# Patient Record
Sex: MALE | Race: WHITE | NOT HISPANIC OR LATINO | Employment: UNEMPLOYED | URBAN - METROPOLITAN AREA
[De-identification: names, ages, dates, MRNs, and addresses within clinical notes are randomized per-mention and may not be internally consistent; named-entity substitution may affect disease eponyms.]

---

## 2018-12-02 ENCOUNTER — HOSPITAL ENCOUNTER (EMERGENCY)
Facility: HOSPITAL | Age: 68
Discharge: HOME | End: 2018-12-02
Attending: EMERGENCY MEDICINE | Admitting: EMERGENCY MEDICINE

## 2018-12-02 VITALS
SYSTOLIC BLOOD PRESSURE: 146 MMHG | TEMPERATURE: 96.7 F | HEART RATE: 96 BPM | OXYGEN SATURATION: 99 % | DIASTOLIC BLOOD PRESSURE: 88 MMHG | HEIGHT: 72 IN | RESPIRATION RATE: 16 BRPM

## 2018-12-02 DIAGNOSIS — R55 VASOVAGAL NEAR SYNCOPE: Primary | ICD-10-CM

## 2018-12-02 DIAGNOSIS — J32.9 SINUSITIS, BACTERIAL: ICD-10-CM

## 2018-12-02 DIAGNOSIS — B96.89 SINUSITIS, BACTERIAL: ICD-10-CM

## 2018-12-02 LAB
ANION GAP SERPL CALCULATED.3IONS-SCNC: 8.3 MMOL/L
BASOPHILS # BLD AUTO: 0.03 10*3/MM3 (ref 0–0.2)
BASOPHILS NFR BLD AUTO: 0.4 % (ref 0–1.5)
BUN BLD-MCNC: 16 MG/DL (ref 8–23)
BUN/CREAT SERPL: 14.4 (ref 7–25)
CALCIUM SPEC-SCNC: 10 MG/DL (ref 8.6–10.5)
CHLORIDE SERPL-SCNC: 102 MMOL/L (ref 98–107)
CO2 SERPL-SCNC: 26.7 MMOL/L (ref 22–29)
CREAT BLD-MCNC: 1.11 MG/DL (ref 0.76–1.27)
DEPRECATED RDW RBC AUTO: 42.9 FL (ref 37–54)
EOSINOPHIL # BLD AUTO: 0.28 10*3/MM3 (ref 0–0.7)
EOSINOPHIL NFR BLD AUTO: 3.3 % (ref 0.3–6.2)
ERYTHROCYTE [DISTWIDTH] IN BLOOD BY AUTOMATED COUNT: 13.5 % (ref 11.5–14.5)
GFR SERPL CREATININE-BSD FRML MDRD: 66 ML/MIN/1.73
GLUCOSE BLD-MCNC: 147 MG/DL (ref 65–99)
HCT VFR BLD AUTO: 42.3 % (ref 40.4–52.2)
HGB BLD-MCNC: 15.3 G/DL (ref 13.7–17.6)
IMM GRANULOCYTES # BLD: 0.02 10*3/MM3 (ref 0–0.03)
IMM GRANULOCYTES NFR BLD: 0.2 % (ref 0–0.5)
LYMPHOCYTES # BLD AUTO: 1.02 10*3/MM3 (ref 0.9–4.8)
LYMPHOCYTES NFR BLD AUTO: 12.1 % (ref 19.6–45.3)
MCH RBC QN AUTO: 31.7 PG (ref 27–32.7)
MCHC RBC AUTO-ENTMCNC: 36.2 G/DL (ref 32.6–36.4)
MCV RBC AUTO: 87.6 FL (ref 79.8–96.2)
MONOCYTES # BLD AUTO: 0.62 10*3/MM3 (ref 0.2–1.2)
MONOCYTES NFR BLD AUTO: 7.4 % (ref 5–12)
NEUTROPHILS # BLD AUTO: 6.45 10*3/MM3 (ref 1.9–8.1)
NEUTROPHILS NFR BLD AUTO: 76.8 % (ref 42.7–76)
PLATELET # BLD AUTO: 161 10*3/MM3 (ref 140–500)
PMV BLD AUTO: 11.1 FL (ref 6–12)
POTASSIUM BLD-SCNC: 4.1 MMOL/L (ref 3.5–5.2)
RBC # BLD AUTO: 4.83 10*6/MM3 (ref 4.6–6)
SODIUM BLD-SCNC: 137 MMOL/L (ref 136–145)
TROPONIN T SERPL-MCNC: <0.01 NG/ML (ref 0–0.03)
WBC NRBC COR # BLD: 8.4 10*3/MM3 (ref 4.5–10.7)

## 2018-12-02 PROCEDURE — 80048 BASIC METABOLIC PNL TOTAL CA: CPT | Performed by: EMERGENCY MEDICINE

## 2018-12-02 PROCEDURE — 93010 ELECTROCARDIOGRAM REPORT: CPT | Performed by: INTERNAL MEDICINE

## 2018-12-02 PROCEDURE — 84484 ASSAY OF TROPONIN QUANT: CPT | Performed by: EMERGENCY MEDICINE

## 2018-12-02 PROCEDURE — 96360 HYDRATION IV INFUSION INIT: CPT

## 2018-12-02 PROCEDURE — 99283 EMERGENCY DEPT VISIT LOW MDM: CPT

## 2018-12-02 PROCEDURE — 93005 ELECTROCARDIOGRAM TRACING: CPT | Performed by: EMERGENCY MEDICINE

## 2018-12-02 PROCEDURE — 85025 COMPLETE CBC W/AUTO DIFF WBC: CPT | Performed by: EMERGENCY MEDICINE

## 2018-12-02 RX ORDER — SODIUM CHLORIDE 0.9 % (FLUSH) 0.9 %
10 SYRINGE (ML) INJECTION AS NEEDED
Status: DISCONTINUED | OUTPATIENT
Start: 2018-12-02 | End: 2018-12-02 | Stop reason: HOSPADM

## 2018-12-02 RX ORDER — ASPIRIN 81 MG/1
81 TABLET ORAL DAILY
COMMUNITY

## 2018-12-02 RX ORDER — AMOXICILLIN AND CLAVULANATE POTASSIUM 875; 125 MG/1; MG/1
1 TABLET, FILM COATED ORAL 2 TIMES DAILY
Qty: 14 TABLET | Refills: 0 | Status: SHIPPED | OUTPATIENT
Start: 2018-12-02 | End: 2018-12-09

## 2018-12-02 RX ADMIN — SODIUM CHLORIDE, POTASSIUM CHLORIDE, SODIUM LACTATE AND CALCIUM CHLORIDE 1000 ML: 600; 310; 30; 20 INJECTION, SOLUTION INTRAVENOUS at 13:19

## 2018-12-02 NOTE — ED PROVIDER NOTES
" EMERGENCY DEPARTMENT ENCOUNTER    Room Number:  37/37  Date seen:  12/2/2018  Time seen: 12:24 PM  PCP: Tung Posadas MD  Historian: Pt, EMS      HPI:  Chief Complaint: Near Syncope  A complete HPI/ROS/PMH/PSH/SH/FH are unobtainable due to: none  Context: Ld Carmen is a 68 y.o. male who presents to the ED c/o near syncopal episode that occurred just PTA, currently resolved. 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 Pt reports prodrome of light-headedness and became diaphoretic, pale, and cool to touch. Pt also c/o sinus pain. Pt denies CP or SOA. Pt states he also had an episode of light-headedness and diaphoresis last night. Pt reports Hx of HTN. Pt was recently seen by Cardiologist which \"came back good\". Pt isn't a smoker. Per EMS, pt has no significant cardiac history. Per EMS, pt has no pain other than back pain from being on the stretcher. Per EMS, pt was given 250 cc of fluid and blood pressure was 158 systolic.     Location: n/a  Radiation: n/a  Quality: near syncope  Intensity/Severity: moderate  Duration: brief  Onset quality: gradual  Timing: episodic  Progression: resolved  Aggravating Factors: standing up  Alleviating Factors: rest  Previous Episodes: none  Treatment before arrival: Pt was given 250 cc of fluid by EMS.   Associated Symptoms: light-headedness, diaphoresis, pallor skin    PAST MEDICAL HISTORY  Active Ambulatory Problems     Diagnosis Date Noted   • No Active Ambulatory Problems     Resolved Ambulatory Problems     Diagnosis Date Noted   • No Resolved Ambulatory Problems     Past Medical History:   Diagnosis Date   • Arthritis    • Hypertension          PAST SURGICAL HISTORY  History reviewed. No pertinent surgical history.      FAMILY HISTORY  History reviewed. No pertinent family history.      SOCIAL HISTORY  Social History     Socioeconomic History   • Marital status:      Spouse name: Not on file   • Number of children: Not on " file   • Years of education: Not on file   • Highest education level: Not on file   Social Needs   • Financial resource strain: Not on file   • Food insecurity - worry: Not on file   • Food insecurity - inability: Not on file   • Transportation needs - medical: Not on file   • Transportation needs - non-medical: Not on file   Occupational History   • Not on file   Tobacco Use   • Smoking status: Never Smoker   Substance and Sexual Activity   • Alcohol use: Yes     Frequency: Never     Comment: occ   • Drug use: Not on file   • Sexual activity: Not on file   Other Topics Concern   • Not on file   Social History Narrative   • Not on file         ALLERGIES  Heparin and Nitrates, organic        REVIEW OF SYSTEMS  Review of Systems   Constitutional: Positive for diaphoresis. Negative for chills and fever.   HENT: Positive for sinus pain.    Eyes: Negative.    Respiratory: Negative for shortness of breath.    Cardiovascular: Negative for chest pain.   Gastrointestinal: Negative for abdominal pain.   Genitourinary: Negative.    Musculoskeletal: Negative.    Skin: Positive for pallor.   Neurological: Positive for dizziness and light-headedness.   Psychiatric/Behavioral: Negative.             PHYSICAL EXAM  ED Triage Vitals [12/02/18 1212]   Temp Heart Rate Resp BP SpO2   96.7 °F (35.9 °C) 58 14 158/72 97 %      Temp src Heart Rate Source Patient Position BP Location FiO2 (%)   Tympanic Monitor -- -- --         GENERAL: not distressed  HENT: nares patent  EYES: no scleral icterus  CV: regular rhythm, regular rate, no murmur  RESPIRATORY: normal effort, CTAB  ABDOMEN: soft, non-tender  MUSCULOSKELETAL: no deformity, No BLE edema or tenderness.  NEURO: alert, FRANKLIN, FC  SKIN: warm, dry    Vital signs and nursing notes reviewed.      LAB RESULTS  Recent Results (from the past 24 hour(s))   Basic Metabolic Panel    Collection Time: 12/02/18 12:35 PM   Result Value Ref Range    Glucose 147 (H) 65 - 99 mg/dL    BUN 16 8 - 23 mg/dL     Creatinine 1.11 0.76 - 1.27 mg/dL    Sodium 137 136 - 145 mmol/L    Potassium 4.1 3.5 - 5.2 mmol/L    Chloride 102 98 - 107 mmol/L    CO2 26.7 22.0 - 29.0 mmol/L    Calcium 10.0 8.6 - 10.5 mg/dL    eGFR Non African Amer 66 >60 mL/min/1.73    BUN/Creatinine Ratio 14.4 7.0 - 25.0    Anion Gap 8.3 mmol/L   Troponin    Collection Time: 12/02/18 12:35 PM   Result Value Ref Range    Troponin T <0.010 0.000 - 0.030 ng/mL   CBC Auto Differential    Collection Time: 12/02/18 12:35 PM   Result Value Ref Range    WBC 8.40 4.50 - 10.70 10*3/mm3    RBC 4.83 4.60 - 6.00 10*6/mm3    Hemoglobin 15.3 13.7 - 17.6 g/dL    Hematocrit 42.3 40.4 - 52.2 %    MCV 87.6 79.8 - 96.2 fL    MCH 31.7 27.0 - 32.7 pg    MCHC 36.2 32.6 - 36.4 g/dL    RDW 13.5 11.5 - 14.5 %    RDW-SD 42.9 37.0 - 54.0 fl    MPV 11.1 6.0 - 12.0 fL    Platelets 161 140 - 500 10*3/mm3    Neutrophil % 76.8 (H) 42.7 - 76.0 %    Lymphocyte % 12.1 (L) 19.6 - 45.3 %    Monocyte % 7.4 5.0 - 12.0 %    Eosinophil % 3.3 0.3 - 6.2 %    Basophil % 0.4 0.0 - 1.5 %    Immature Grans % 0.2 0.0 - 0.5 %    Neutrophils, Absolute 6.45 1.90 - 8.10 10*3/mm3    Lymphocytes, Absolute 1.02 0.90 - 4.80 10*3/mm3    Monocytes, Absolute 0.62 0.20 - 1.20 10*3/mm3    Eosinophils, Absolute 0.28 0.00 - 0.70 10*3/mm3    Basophils, Absolute 0.03 0.00 - 0.20 10*3/mm3    Immature Grans, Absolute 0.02 0.00 - 0.03 10*3/mm3       Ordered the above labs and reviewed the results.      PROCEDURES  Procedures        EKG:           EKG time: 1244  Rhythm/Rate: NSR, 53  P waves and DE: 1st degree AV block  QRS, axis: PRWP low voltage   ST and T waves: normal     Interpreted Contemporaneously by me, independently viewed  No prior              MEDICATIONS GIVEN IN ER  Medications   sodium chloride 0.9 % flush 10 mL (not administered)   lactated ringers bolus 1,000 mL (1,000 mL Intravenous New Bag 12/2/18 1319)           PROGRESS AND CONSULTS       1230  Ordered lab work, EKG, and IVF's.     1413  Rechecked pt.  Pt is resting comfortably. Notified pt of unremarkable lab work, including negative troponin, and EKG results. Discussed the plan to discharge the pt home with prescriptions for Augmentin. I instructed the pt to drink plenty of fluids and f/u with PCP. RTER instructions given. Pt understands and agrees with the plan, all questions answered.    MEDICAL DECISION MAKING      MDM  Number of Diagnoses or Management Options  Sinusitis, bacterial:   Vasovagal near syncope:   Diagnosis management comments: Pt with near syncope. Good story that makes this safe for d/c home. Was standing up for long period of time. Pain with sinus pain and drainage for 2 weeks. This meets criteria for appropriate abx. Therefore, I prescribed for him. Gave good RTER precautions.        Amount and/or Complexity of Data Reviewed  Clinical lab tests: ordered and reviewed (Lab work is unremarkable.)  Tests in the medicine section of CPT®: ordered and reviewed (See EKG results in procedure)  Independent visualization of images, tracings, or specimens: yes           DIAGNOSIS  Final diagnoses:   Vasovagal near syncope   Sinusitis, bacterial         DISPOSITION  DISCHARGE    Patient discharged in stable condition.    Reviewed implications of results, diagnosis, meds, responsibility to follow up, warning signs and symptoms of possible worsening, potential complications and reasons to return to ER.    Patient/Family voiced understanding of above instructions.    Discussed plan for discharge, as there is no emergent indication for admission. Patient referred to primary care provider for BP management due to today's BP. Pt/family is agreeable and understands need for follow up and repeat testing.  Pt is aware that discharge does not mean that nothing is wrong but it indicates no emergency is present that requires admission and they must continue care with follow-up as given below or physician of their choice.     FOLLOW-UP  Tung Posadas MD  [address] [place] [address]  [phone]    Schedule an appointment as soon as possible for a visit   As needed         Medication List      New Prescriptions    amoxicillin-clavulanate 875-125 MG per tablet  Commonly known as:  AUGMENTIN  Take 1 tablet by mouth 2 (Two) Times a Day for 7 days.                      Latest Documented Vital Signs:  As of 2:18 PM  BP- 146/88 HR- 100 Temp- 96.7 °F (35.9 °C) (Tympanic) O2 sat- 99%        --  Documentation assistance provided by rodney Contreras for Dr. Rolf MD.  Information recorded by the scribpattie was done at my direction and has been verified and validated by me.          Nicholas Contreras  12/02/18 8780       Jonathan Bansal II, MD  12/02/18 9051

## 2018-12-02 NOTE — ED NOTES
Pt reports near-syncope after standing at [employer] today. Pt also had a similar episode yesterday, with near-syncope. Pt reports diaphoresis with these episodes. Pt received 250 mL NS with EMS.      Marcie Vasquez RN  12/02/18 4224

## 2019-08-05 ENCOUNTER — OFFICE VISIT (OUTPATIENT)
Dept: CARDIOLOGY | Facility: CLINIC | Age: 69
End: 2019-08-05

## 2019-08-05 VITALS
DIASTOLIC BLOOD PRESSURE: 81 MMHG | HEART RATE: 70 BPM | HEIGHT: 72 IN | WEIGHT: 247 LBS | SYSTOLIC BLOOD PRESSURE: 153 MMHG | BODY MASS INDEX: 33.46 KG/M2

## 2019-08-05 DIAGNOSIS — R01.1 HEART MURMUR: Primary | ICD-10-CM

## 2019-08-05 DIAGNOSIS — R06.09 DOE (DYSPNEA ON EXERTION): ICD-10-CM

## 2019-08-05 PROCEDURE — 93000 ELECTROCARDIOGRAM COMPLETE: CPT | Performed by: INTERNAL MEDICINE

## 2019-08-05 PROCEDURE — 99203 OFFICE O/P NEW LOW 30 MIN: CPT | Performed by: INTERNAL MEDICINE

## 2019-08-05 RX ORDER — FOLIC ACID 1 MG/1
TABLET ORAL
COMMUNITY
Start: 2019-06-08

## 2019-08-05 RX ORDER — TRIAMTERENE AND HYDROCHLOROTHIAZIDE 37.5; 25 MG/1; MG/1
1 TABLET ORAL
COMMUNITY

## 2019-08-05 RX ORDER — HYDRALAZINE HYDROCHLORIDE 50 MG/1
TABLET, FILM COATED ORAL
COMMUNITY
Start: 2019-05-24 | End: 2022-06-30

## 2019-08-05 RX ORDER — ALFUZOSIN HYDROCHLORIDE 10 MG/1
TABLET, EXTENDED RELEASE ORAL
COMMUNITY
Start: 2019-07-15 | End: 2022-06-30

## 2019-08-05 RX ORDER — MULTIVITAMIN WITH IRON
TABLET ORAL
COMMUNITY

## 2019-08-05 NOTE — PROGRESS NOTES
Subjective:        [facility]  Cardiology Consult Note    Patient Identification:  Name: Ld Carmen  Age: 68 y.o.  Sex: male  :  1950  MRN: [id number]             CC  MURMUR DETECTED BY PCP    OCCASIONAL CHESST TENDERNESS    LIGHTHEADED LAST DECEMBER      History of Present Illness:   68-year-old male here for the cardiac evaluation as well as establishment of the care was found to have a murmur detected by the primary care physician recently    Patient also has been complaining of shortness of breath on moderate exertion    Ld Carmen has been complaining of the shortness of breath mild-to-moderate in intensity with mild-to-moderate usually relieved with rest associated with anxiety and fatigue      Comorbid cardiac risk factors:     Past Medical History:  Past Medical History:   Diagnosis Date   • Arthritis    • Hypertension      Past Surgical History:  History reviewed. No pertinent surgical history.   Allergies:  Allergies   Allergen Reactions   • Heparin Other (See Comments)     HIT   • Nitrates, Organic Other (See Comments)     Cardiac arrest     Home Meds:    (Not in a hospital admission)  Current Meds:   [unfilled]  Social History:   Social History     Tobacco Use   • Smoking status: Never Smoker   • Smokeless tobacco: Never Used   Substance Use Topics   • Alcohol use: Yes     Frequency: Never     Comment: occ      Family History:  Family History   Problem Relation Age of Onset   • Hypertension Mother    • Heart disease Mother    • Heart disease Father         Review of Systems    Constitutional: No weakness,fatigue, fever, rigors, chills   Eyes: No vision changes, eye pain   ENT/oropharynx: No difficulty swallowing, sore throat, epistaxis, changes in hearing   Cardiovascular: No chest pain, chest tightness, palpitations, paroxysmal nocturnal dyspnea, orthopnea, diaphoresis, dizziness / syncopal episode   Respiratory: No shortness of breath, dyspnea on exertion, cough, wheezing  hemoptysis   Gastrointestinal: No abdominal pain, nausea, vomiting, diarrhea, bloody stools   Genitourinary: No hematuria, dysuria   Neurological: No headache, tremors, numbness,  one-sided weakness    Musculoskeletal: No cramps, myalgias,  joint pain, joint swelling   Integument: No rash, edema           Constitutional:  Heart Rate:  [70] 70  BP: (153)/(81) 153/81    Physical Exam   General:  Appears in no acute distress  Eyes: PERTL,  HEENT:  No JVD. Thyroid not visibly enlarged. No mucosal pallor or cyanosis  Respiratory: Respirations regular and unlabored at rest. BBS with good air entry in all fields. No crackles, rubs or wheezes auscultated  Cardiovascular: S1S2 Regular rate and rhythm. SYS murmur, rub or gallop auscultated. No carotid bruits. DP/PT pulses    . No pretibial pitting edema  Gastrointestinal: Abdomen soft, flat, non tender. Bowel sounds present. No hepatosplenomegaly. No ascites  Musculoskeletal: FRANKLIN x4. No abnormal movements  Extremities: No digital clubbing or cyanosis  Skin: Color pink. Skin warm and dry to touch. No rashes  No xanthoma  Neuro: AAO x3 CN II-XII grossly intact            ECG 12 Lead  Date/Time: 8/5/2019 1:19 PM  Performed by: Hardeep Barger MD  Authorized by: Hardeep Barger MD   Comparison: not compared with previous ECG   Previous ECG: no previous ECG available  Rhythm: sinus rhythm  ST Flattening: all    Clinical impression: non-specific ECG                Cardiographics  ECG:     Telemetry:    Echocardiogram:     Imaging  Chest X-ray:     Lab Review               @[email or web address]@              Assessment:/ Recommendations / Plan:   Patient Active Problem List   Diagnosis   • Heart murmur   • RUIZ (dyspnea on exertion)                    ICD-10-CM ICD-9-CM   1. Heart murmur R01.1 785.2   2. RUIZ (dyspnea on exertion) R06.09 786.09     1. Heart murmur  Considering the patient's symptoms as well as clinical situation and  EKG findings, along with cardiac risk  factors, ischemic workup is necessary to rule out ischemic cardiomyopathy, stress induced arrhythmias, and functional capacity for diagnosis as well as prognostic consideration    - Treadmill Stress Test  - Adult Transthoracic Echo Complete W/ Cont if Necessary Per Protocol    2. RUIZ (dyspnea on exertion)  Considering patient's medical condition as well as the risk factors, patient will require echocardiogram for further evaluation for the LV function, four-chamber evaluation, including the pressures, valvular function and  pericardial disease and pericardial effusion    - Treadmill Stress Test  - Adult Transthoracic Echo Complete W/ Cont if Necessary Per Protocol       ETT, ECHO        Labs/tests ordered for am      Hardeep Barger MD  8/5/2019, 1:19 PM      EMR Dragon/Transcription:   Dictated utilizing Dragon dictation

## 2019-08-16 PROBLEM — R06.09 DOE (DYSPNEA ON EXERTION): Status: ACTIVE | Noted: 2019-08-16

## 2019-08-16 PROBLEM — R01.1 HEART MURMUR: Status: ACTIVE | Noted: 2019-08-16

## 2019-08-21 ENCOUNTER — HOSPITAL ENCOUNTER (OUTPATIENT)
Dept: CARDIOLOGY | Facility: HOSPITAL | Age: 69
Discharge: HOME | End: 2019-08-21
Admitting: INTERNAL MEDICINE

## 2019-08-21 VITALS
HEART RATE: 79 BPM | BODY MASS INDEX: 33.46 KG/M2 | WEIGHT: 247 LBS | HEIGHT: 72 IN | SYSTOLIC BLOOD PRESSURE: 149 MMHG | DIASTOLIC BLOOD PRESSURE: 89 MMHG

## 2019-08-21 PROCEDURE — 93306 TTE W/DOPPLER COMPLETE: CPT | Performed by: INTERNAL MEDICINE

## 2019-08-21 PROCEDURE — 93306 TTE W/DOPPLER COMPLETE: CPT

## 2019-08-23 LAB
BH CV ECHO MEAS - ACS: 2.1 CM
BH CV ECHO MEAS - AO MAX PG (FULL): 0.48 MMHG
BH CV ECHO MEAS - AO MAX PG: 6 MMHG
BH CV ECHO MEAS - AO MEAN PG (FULL): 0 MMHG
BH CV ECHO MEAS - AO MEAN PG: 3 MMHG
BH CV ECHO MEAS - AO ROOT AREA (BSA CORRECTED): 1.5
BH CV ECHO MEAS - AO ROOT AREA: 10.2 CM^2
BH CV ECHO MEAS - AO ROOT DIAM: 3.6 CM
BH CV ECHO MEAS - AO V2 MAX: 122 CM/SEC
BH CV ECHO MEAS - AO V2 MEAN: 77.6 CM/SEC
BH CV ECHO MEAS - AO V2 VTI: 22.3 CM
BH CV ECHO MEAS - AVA(I,A): 4.5 CM^2
BH CV ECHO MEAS - AVA(I,D): 4.5 CM^2
BH CV ECHO MEAS - AVA(V,A): 4 CM^2
BH CV ECHO MEAS - AVA(V,D): 4 CM^2
BH CV ECHO MEAS - BSA(HAYCOCK): 2.4 M^2
BH CV ECHO MEAS - BSA: 2.3 M^2
BH CV ECHO MEAS - BZI_BMI: 33.5 KILOGRAMS/M^2
BH CV ECHO MEAS - BZI_METRIC_HEIGHT: 182.9 CM
BH CV ECHO MEAS - BZI_METRIC_WEIGHT: 112 KG
BH CV ECHO MEAS - EDV(CUBED): 68.9 ML
BH CV ECHO MEAS - EDV(MOD-SP2): 100 ML
BH CV ECHO MEAS - EDV(MOD-SP4): 103 ML
BH CV ECHO MEAS - EDV(TEICH): 74.2 ML
BH CV ECHO MEAS - EF(CUBED): 74.5 %
BH CV ECHO MEAS - EF(MOD-BP): 65 %
BH CV ECHO MEAS - EF(MOD-SP2): 63 %
BH CV ECHO MEAS - EF(MOD-SP4): 68 %
BH CV ECHO MEAS - EF(TEICH): 66.8 %
BH CV ECHO MEAS - ESV(CUBED): 17.6 ML
BH CV ECHO MEAS - ESV(MOD-SP2): 37 ML
BH CV ECHO MEAS - ESV(MOD-SP4): 33 ML
BH CV ECHO MEAS - ESV(TEICH): 24.6 ML
BH CV ECHO MEAS - FS: 36.6 %
BH CV ECHO MEAS - IVS/LVPW: 1.1
BH CV ECHO MEAS - IVSD: 1.2 CM
BH CV ECHO MEAS - LA DIMENSION: 4.9 CM
BH CV ECHO MEAS - LA/AO: 1.4
BH CV ECHO MEAS - LAT PEAK E' VEL: 10.1 CM/SEC
BH CV ECHO MEAS - LV DIASTOLIC VOL/BSA (35-75): 44.2 ML/M^2
BH CV ECHO MEAS - LV MASS(C)D: 161.4 GRAMS
BH CV ECHO MEAS - LV MASS(C)DI: 69.2 GRAMS/M^2
BH CV ECHO MEAS - LV MAX PG: 5.5 MMHG
BH CV ECHO MEAS - LV MEAN PG: 3 MMHG
BH CV ECHO MEAS - LV SYSTOLIC VOL/BSA (12-30): 14.2 ML/M^2
BH CV ECHO MEAS - LV V1 MAX: 117 CM/SEC
BH CV ECHO MEAS - LV V1 MEAN: 75 CM/SEC
BH CV ECHO MEAS - LV V1 VTI: 24.1 CM
BH CV ECHO MEAS - LVIDD: 4.1 CM
BH CV ECHO MEAS - LVIDS: 2.6 CM
BH CV ECHO MEAS - LVLD AP2: 7.6 CM
BH CV ECHO MEAS - LVLD AP4: 7.7 CM
BH CV ECHO MEAS - LVLS AP2: 6.2 CM
BH CV ECHO MEAS - LVLS AP4: 5.8 CM
BH CV ECHO MEAS - LVOT AREA (M): 4.2 CM^2
BH CV ECHO MEAS - LVOT AREA: 4.2 CM^2
BH CV ECHO MEAS - LVOT DIAM: 2.3 CM
BH CV ECHO MEAS - LVPWD: 1.1 CM
BH CV ECHO MEAS - MED PEAK E' VEL: 8.5 CM/SEC
BH CV ECHO MEAS - MV A DUR: 0.2 SEC
BH CV ECHO MEAS - MV A MAX VEL: 90.2 CM/SEC
BH CV ECHO MEAS - MV DEC SLOPE: 207 CM/SEC^2
BH CV ECHO MEAS - MV DEC TIME: 0.21 SEC
BH CV ECHO MEAS - MV E MAX VEL: 81 CM/SEC
BH CV ECHO MEAS - MV E/A: 0.9
BH CV ECHO MEAS - MV MAX PG: 3.2 MMHG
BH CV ECHO MEAS - MV MEAN PG: 1 MMHG
BH CV ECHO MEAS - MV P1/2T MAX VEL: 64.3 CM/SEC
BH CV ECHO MEAS - MV P1/2T: 91 MSEC
BH CV ECHO MEAS - MV V2 MAX: 88.8 CM/SEC
BH CV ECHO MEAS - MV V2 MEAN: 50 CM/SEC
BH CV ECHO MEAS - MV V2 VTI: 23.2 CM
BH CV ECHO MEAS - MVA P1/2T LCG: 3.4 CM^2
BH CV ECHO MEAS - MVA(P1/2T): 2.4 CM^2
BH CV ECHO MEAS - MVA(VTI): 4.3 CM^2
BH CV ECHO MEAS - PA ACC TIME: 0.11 SEC
BH CV ECHO MEAS - PA MAX PG (FULL): 1.4 MMHG
BH CV ECHO MEAS - PA MAX PG: 4.4 MMHG
BH CV ECHO MEAS - PA PR(ACCEL): 29.1 MMHG
BH CV ECHO MEAS - PA V2 MAX: 105 CM/SEC
BH CV ECHO MEAS - PULM A REVS DUR: 0.15 SEC
BH CV ECHO MEAS - PULM A REVS VEL: 33.5 CM/SEC
BH CV ECHO MEAS - PULM DIAS VEL: 37.6 CM/SEC
BH CV ECHO MEAS - PULM S/D: 1.8
BH CV ECHO MEAS - PULM SYS VEL: 69.3 CM/SEC
BH CV ECHO MEAS - PVA(V,A): 3.5 CM^2
BH CV ECHO MEAS - PVA(V,D): 3.5 CM^2
BH CV ECHO MEAS - QP/QS: 0.75
BH CV ECHO MEAS - RAP SYSTOLE: 3 MMHG
BH CV ECHO MEAS - RV MAX PG: 3 MMHG
BH CV ECHO MEAS - RV MEAN PG: 2 MMHG
BH CV ECHO MEAS - RV V1 MAX: 87.2 CM/SEC
BH CV ECHO MEAS - RV V1 MEAN: 61.3 CM/SEC
BH CV ECHO MEAS - RV V1 VTI: 18 CM
BH CV ECHO MEAS - RVDD: 3.2 CM
BH CV ECHO MEAS - RVOT AREA: 4.2 CM^2
BH CV ECHO MEAS - RVOT DIAM: 2.3 CM
BH CV ECHO MEAS - RVSP: 23.8 MMHG
BH CV ECHO MEAS - SI(AO): 97.4 ML/M^2
BH CV ECHO MEAS - SI(CUBED): 22 ML/M^2
BH CV ECHO MEAS - SI(LVOT): 43 ML/M^2
BH CV ECHO MEAS - SI(MOD-SP2): 27 ML/M^2
BH CV ECHO MEAS - SI(MOD-SP4): 30 ML/M^2
BH CV ECHO MEAS - SI(TEICH): 21.3 ML/M^2
BH CV ECHO MEAS - SV(AO): 227 ML
BH CV ECHO MEAS - SV(CUBED): 51.3 ML
BH CV ECHO MEAS - SV(LVOT): 100.1 ML
BH CV ECHO MEAS - SV(MOD-SP2): 63 ML
BH CV ECHO MEAS - SV(MOD-SP4): 70 ML
BH CV ECHO MEAS - SV(RVOT): 74.8 ML
BH CV ECHO MEAS - SV(TEICH): 49.6 ML
BH CV ECHO MEAS - TAPSE (>1.6): 3.2 CM
BH CV ECHO MEAS - TR MAX VEL: 228 CM/SEC
BH CV ECHO MEASUREMENTS AVERAGE E/E' RATIO: 8.71
BH CV XLRA - RV BASE: 3.4 CM
BH CV XLRA - RV LENGTH: 6.9 CM
BH CV XLRA - RV MID: 2.8 CM
BH CV XLRA - TDI S': 23.4 CM/SEC
LEFT ATRIUM VOLUME INDEX: 29 ML/M2

## 2019-08-26 ENCOUNTER — HOSPITAL ENCOUNTER (OUTPATIENT)
Dept: CARDIOLOGY | Facility: HOSPITAL | Age: 69
Discharge: HOME | End: 2019-08-26
Admitting: INTERNAL MEDICINE

## 2019-08-26 VITALS — DIASTOLIC BLOOD PRESSURE: 84 MMHG | HEART RATE: 108 BPM | SYSTOLIC BLOOD PRESSURE: 158 MMHG

## 2019-08-26 PROCEDURE — 93016 CV STRESS TEST SUPVJ ONLY: CPT | Performed by: INTERNAL MEDICINE

## 2019-08-26 PROCEDURE — 93017 CV STRESS TEST TRACING ONLY: CPT

## 2019-08-26 PROCEDURE — 93018 CV STRESS TEST I&R ONLY: CPT | Performed by: INTERNAL MEDICINE

## 2019-08-27 LAB
BH CV STRESS BP STAGE 1: NORMAL
BH CV STRESS BP STAGE 2: NORMAL
BH CV STRESS DURATION MIN STAGE 1: 3
BH CV STRESS DURATION MIN STAGE 2: 3
BH CV STRESS DURATION SEC STAGE 1: 0
BH CV STRESS DURATION SEC STAGE 2: 0
BH CV STRESS GRADE STAGE 1: 10
BH CV STRESS GRADE STAGE 2: 12
BH CV STRESS HR STAGE 1: 137
BH CV STRESS HR STAGE 2: 146
BH CV STRESS METS STAGE 1: 4.6
BH CV STRESS METS STAGE 2: 7.1
BH CV STRESS PROTOCOL 1: NORMAL
BH CV STRESS RECOVERY BP: NORMAL MMHG
BH CV STRESS RECOVERY HR: 111 BPM
BH CV STRESS SPEED STAGE 1: 1.7
BH CV STRESS SPEED STAGE 2: 2.5
BH CV STRESS STAGE 1: 1
BH CV STRESS STAGE 2: 2
MAXIMAL PREDICTED HEART RATE: 153 BPM
PERCENT MAX PREDICTED HR: 95.42 %
STRESS BASELINE BP: NORMAL MMHG
STRESS BASELINE HR: 107 BPM
STRESS PERCENT HR: 112 %
STRESS POST ESTIMATED WORKLOAD: 7.2 METS
STRESS POST EXERCISE DUR MIN: 6 MIN
STRESS POST EXERCISE DUR SEC: 0 SEC
STRESS POST PEAK BP: NORMAL MMHG
STRESS POST PEAK HR: 146 BPM
STRESS TARGET HR: 130 BPM

## 2019-09-05 ENCOUNTER — OFFICE VISIT (OUTPATIENT)
Dept: CARDIOLOGY | Facility: CLINIC | Age: 69
End: 2019-09-05

## 2019-09-05 VITALS
HEART RATE: 80 BPM | DIASTOLIC BLOOD PRESSURE: 76 MMHG | HEIGHT: 72 IN | SYSTOLIC BLOOD PRESSURE: 145 MMHG | BODY MASS INDEX: 32.64 KG/M2 | WEIGHT: 241 LBS

## 2019-09-05 DIAGNOSIS — R01.1 HEART MURMUR: ICD-10-CM

## 2019-09-05 DIAGNOSIS — R06.09 DOE (DYSPNEA ON EXERTION): Primary | ICD-10-CM

## 2019-09-05 PROCEDURE — 99213 OFFICE O/P EST LOW 20 MIN: CPT | Performed by: INTERNAL MEDICINE

## 2019-09-05 NOTE — PROGRESS NOTES
" Subjective:        Ld Carmen is a 69 y.o. male who here for follow up    CC  Follow-up after the stress test and echocardiogram  HPI  69-year-old male with a history of the heart murmur as well as her shortness of breath on exertion here for the follow-up after the stress test and echocardiogram denies any chest pains tightness heaviness of the pressure sensation     Problem List Items Addressed This Visit        Cardiovascular and Mediastinum    Heart murmur       Respiratory    RUIZ (dyspnea on exertion) - Primary      · Arrhythmias were not significant.  No ECG evidence of myocardial ischemia.Negative clinical evidence of myocardial ischemia. Findings consistent with a normal ECG stress test  .  Interpretation Summary     · Left atrial cavity size is mildly dilated.  · There is calcification of the aortic valve.  · Mild mitral valve regurgitation is present  · Mild tricuspid valve regurgitation is present.  · Calculated EF = 65%.  · There is no evidence of pericardial effusion.       The following portions of the patient's history were reviewed and updated as appropriate: allergies, current medications, past family history, past medical history, past social history, past surgical history and problem list.    Past Medical History:   Diagnosis Date   • Arthritis    • Diabetes mellitus (CMS/HCC)    • Hypertension      reports that he has never smoked. He has never used smokeless tobacco. He reports that he drinks alcohol. He reports that he does not use drugs.   Family History   Problem Relation Age of Onset   • Hypertension Mother    • Heart disease Mother    • Heart disease Father        Review of Systems  Constitutional: No wt loss, fever, fatigue  Gastrointestinal: No nausea, abdominal pain  Behavioral/Psych: No insomnia or anxiety   Cardiovascular no chest pains or tightness no chest  Objective:       Physical Exam  /76   Pulse 80   Ht 182.9 cm (72\")   Wt 109 kg (241 lb)   BMI 32.69 kg/m² "   General appearance: No acute changes   Neck: Trachea midline; NECK, supple, no thyromegaly or lymphadenopathy   Lungs: Normal size and shape, normal breath sounds, equal distribution of air, no rales and rhonchi   CV: S1-S2 regular, no murmurs, no rub, no gallop   Abdomen: Soft, non-tender; no masses , no abnormal abdominal sounds   Extremities: No deformity , normal color , no peripheral edema   Skin: Normal temperature, turgor and texture; no rash, ulcers          Procedures      Echocardiogram:        Current Outpatient Medications:   •  alfuzosin (UROXATRAL) 10 MG 24 hr tablet, , Disp: , Rfl:   •  AMLODIPINE BESYLATE PO, Take 5 mg by mouth., Disp: , Rfl:   •  aspirin 81 MG EC tablet, Take 81 mg by mouth Daily., Disp: , Rfl:   •  CoenzymeQ10-Isoleucine-Glycine (CO Q-10) 100-50-25 MG tablet sustained-release 24 hour, 1 po daily, Disp: , Rfl:   •  folic acid (FOLVITE) 1 MG tablet, , Disp: , Rfl:   •  hydrALAZINE (APRESOLINE) 50 MG tablet, , Disp: , Rfl:   •  METHOTREXATE SODIUM PO, Take  by mouth., Disp: , Rfl:   •  SITagliptin (JANUVIA) 100 MG tablet, take 1/2 tablet by oral route  every day, Disp: , Rfl:   •  triamterene-hydrochlorothiazide (MAXZIDE-25) 37.5-25 MG per tablet, Take 1 tablet by mouth., Disp: , Rfl:   •  vitamin B-6 (PYRIDOXINE) 100 MG tablet, 1 po daily, Disp: , Rfl:    Assessment:        Patient Active Problem List   Diagnosis   • Heart murmur   • RUIZ (dyspnea on exertion)               Plan:            ICD-10-CM ICD-9-CM   1. RUIZ (dyspnea on exertion) R06.09 786.09   2. Heart murmur R01.1 785.2     1. RUIZ (dyspnea on exertion)  Work-up has been negative    2. Heart murmur  Normal echocardiogram       Specificity and sensitivity of the stress test/ cardiac workup has been explained. Pt has been explained if  Symptoms continue please go to ER, and further w/p will be required.    Also explained this does not rule out coronary artery disease or the future events, continue to emphasize on risk  reductions for coronary artery disease    Pt also advised to contact PCP for other causes of symptoms      SEE IN 1 YR  COUNSELING:    Ld Rodrigues was given to patient for the following topics: diagnostic results, risk factor reductions, impressions, risks and benefits of treatment options and importance of treatment compliance .       SMOKING COUNSELING:    [unfilled]    Dictated using Dragon dictation

## 2020-09-21 ENCOUNTER — AMBULATORY SURGICAL CENTER (OUTPATIENT)
Dept: URBAN - METROPOLITAN AREA SURGERY 17 | Facility: SURGERY | Age: 70
End: 2020-09-21
Payer: MEDICARE

## 2020-09-21 VITALS
SYSTOLIC BLOOD PRESSURE: 122 MMHG | RESPIRATION RATE: 22 BRPM | DIASTOLIC BLOOD PRESSURE: 83 MMHG | DIASTOLIC BLOOD PRESSURE: 69 MMHG | TEMPERATURE: 96.5 F | DIASTOLIC BLOOD PRESSURE: 74 MMHG | HEART RATE: 60 BPM | DIASTOLIC BLOOD PRESSURE: 64 MMHG | DIASTOLIC BLOOD PRESSURE: 73 MMHG | WEIGHT: 246 LBS | SYSTOLIC BLOOD PRESSURE: 121 MMHG | SYSTOLIC BLOOD PRESSURE: 117 MMHG | HEART RATE: 64 BPM | RESPIRATION RATE: 20 BRPM | HEIGHT: 72 IN | RESPIRATION RATE: 14 BRPM | HEART RATE: 54 BPM | SYSTOLIC BLOOD PRESSURE: 148 MMHG | DIASTOLIC BLOOD PRESSURE: 81 MMHG | SYSTOLIC BLOOD PRESSURE: 156 MMHG | HEART RATE: 81 BPM | OXYGEN SATURATION: 98 % | SYSTOLIC BLOOD PRESSURE: 123 MMHG | DIASTOLIC BLOOD PRESSURE: 76 MMHG | RESPIRATION RATE: 16 BRPM | OXYGEN SATURATION: 96 % | OXYGEN SATURATION: 97 % | SYSTOLIC BLOOD PRESSURE: 115 MMHG | HEART RATE: 58 BPM | HEART RATE: 57 BPM | HEART RATE: 61 BPM | HEART RATE: 63 BPM | DIASTOLIC BLOOD PRESSURE: 70 MMHG | DIASTOLIC BLOOD PRESSURE: 68 MMHG | TEMPERATURE: 96.7 F | RESPIRATION RATE: 23 BRPM | RESPIRATION RATE: 18 BRPM | OXYGEN SATURATION: 99 %

## 2020-09-21 DIAGNOSIS — Z12.11 ENCOUNTER FOR SCREENING FOR MALIGNANT NEOPLASM OF COLON: ICD-10-CM

## 2020-09-21 PROCEDURE — 45378 DIAGNOSTIC COLONOSCOPY: CPT | Mod: PT | Performed by: INTERNAL MEDICINE

## 2022-06-30 ENCOUNTER — OFFICE VISIT (OUTPATIENT)
Dept: CARDIOLOGY | Facility: CLINIC | Age: 72
End: 2022-06-30

## 2022-06-30 VITALS
HEIGHT: 72 IN | SYSTOLIC BLOOD PRESSURE: 148 MMHG | HEART RATE: 86 BPM | DIASTOLIC BLOOD PRESSURE: 88 MMHG | BODY MASS INDEX: 33.72 KG/M2 | WEIGHT: 249 LBS

## 2022-06-30 DIAGNOSIS — I10 PRIMARY HYPERTENSION: ICD-10-CM

## 2022-06-30 DIAGNOSIS — R01.1 HEART MURMUR: ICD-10-CM

## 2022-06-30 DIAGNOSIS — R00.1 BRADYCARDIA, SINUS: Primary | ICD-10-CM

## 2022-06-30 DIAGNOSIS — R94.31 ABNORMAL ELECTROCARDIOGRAM (ECG) (EKG): ICD-10-CM

## 2022-06-30 PROCEDURE — 99214 OFFICE O/P EST MOD 30 MIN: CPT | Performed by: INTERNAL MEDICINE

## 2022-06-30 PROCEDURE — 93000 ELECTROCARDIOGRAM COMPLETE: CPT | Performed by: INTERNAL MEDICINE

## 2022-06-30 RX ORDER — AMLODIPINE AND OLMESARTAN MEDOXOMIL 5; 20 MG/1; MG/1
1 TABLET ORAL DAILY
Qty: 30 TABLET | Refills: 6 | Status: SHIPPED | OUTPATIENT
Start: 2022-06-30 | End: 2023-02-06

## 2022-06-30 RX ORDER — SERTRALINE HYDROCHLORIDE 100 MG/1
TABLET, FILM COATED ORAL
COMMUNITY
Start: 2022-06-04

## 2022-06-30 RX ORDER — ARMODAFINIL 250 MG/1
TABLET ORAL
COMMUNITY
Start: 2022-06-06

## 2022-06-30 RX ORDER — SPIRONOLACTONE AND HYDROCHLOROTHIAZIDE 25; 25 MG/1; MG/1
TABLET ORAL
COMMUNITY
Start: 2022-06-21

## 2022-07-21 ENCOUNTER — HOSPITAL ENCOUNTER (OUTPATIENT)
Dept: CARDIOLOGY | Facility: HOSPITAL | Age: 72
Discharge: HOME | End: 2022-07-21
Admitting: INTERNAL MEDICINE

## 2022-07-21 VITALS
HEART RATE: 97 BPM | BODY MASS INDEX: 33.72 KG/M2 | DIASTOLIC BLOOD PRESSURE: 82 MMHG | WEIGHT: 249 LBS | SYSTOLIC BLOOD PRESSURE: 135 MMHG | HEIGHT: 72 IN

## 2022-07-21 LAB
AORTIC DIMENSIONLESS INDEX: 0.8 (DI)
ASCENDING AORTA: 3.5 CM
BH CV ECHO MEAS - ACS: 2.03 CM
BH CV ECHO MEAS - AO MAX PG: 9.6 MMHG
BH CV ECHO MEAS - AO MEAN PG: 5.3 MMHG
BH CV ECHO MEAS - AO ROOT DIAM: 3.7 CM
BH CV ECHO MEAS - AO V2 MAX: 155.1 CM/SEC
BH CV ECHO MEAS - AO V2 VTI: 31.8 CM
BH CV ECHO MEAS - AVA(I,D): 3.6 CM2
BH CV ECHO MEAS - EDV(CUBED): 114.8 ML
BH CV ECHO MEAS - EDV(MOD-SP2): 115 ML
BH CV ECHO MEAS - EDV(MOD-SP4): 120 ML
BH CV ECHO MEAS - EF(MOD-BP): 62.6 %
BH CV ECHO MEAS - EF(MOD-SP2): 62.6 %
BH CV ECHO MEAS - EF(MOD-SP4): 63.3 %
BH CV ECHO MEAS - ESV(CUBED): 24.3 ML
BH CV ECHO MEAS - ESV(MOD-SP2): 43 ML
BH CV ECHO MEAS - ESV(MOD-SP4): 44 ML
BH CV ECHO MEAS - FS: 40.4 %
BH CV ECHO MEAS - IVS/LVPW: 0.83 CM
BH CV ECHO MEAS - IVSD: 0.82 CM
BH CV ECHO MEAS - LAT PEAK E' VEL: 9.6 CM/SEC
BH CV ECHO MEAS - LV DIASTOLIC VOL/BSA (35-75): 51.3 CM2
BH CV ECHO MEAS - LV MASS(C)D: 150.6 GRAMS
BH CV ECHO MEAS - LV MAX PG: 6.6 MMHG
BH CV ECHO MEAS - LV MEAN PG: 3.2 MMHG
BH CV ECHO MEAS - LV SYSTOLIC VOL/BSA (12-30): 18.8 CM2
BH CV ECHO MEAS - LV V1 MAX: 128.5 CM/SEC
BH CV ECHO MEAS - LV V1 VTI: 24.7 CM
BH CV ECHO MEAS - LVIDD: 4.9 CM
BH CV ECHO MEAS - LVIDS: 2.9 CM
BH CV ECHO MEAS - LVOT AREA: 4.6 CM2
BH CV ECHO MEAS - LVOT DIAM: 2.42 CM
BH CV ECHO MEAS - LVPWD: 0.98 CM
BH CV ECHO MEAS - MED PEAK E' VEL: 7.7 CM/SEC
BH CV ECHO MEAS - MV A DUR: 0.14 SEC
BH CV ECHO MEAS - MV A MAX VEL: 81 CM/SEC
BH CV ECHO MEAS - MV DEC SLOPE: 253.1 CM/SEC2
BH CV ECHO MEAS - MV DEC TIME: 232 MSEC
BH CV ECHO MEAS - MV E MAX VEL: 86.8 CM/SEC
BH CV ECHO MEAS - MV E/A: 1.07
BH CV ECHO MEAS - MV MAX PG: 4.7 MMHG
BH CV ECHO MEAS - MV MEAN PG: 1.56 MMHG
BH CV ECHO MEAS - MV P1/2T: 114.7 MSEC
BH CV ECHO MEAS - MV V2 VTI: 34.4 CM
BH CV ECHO MEAS - MVA(P1/2T): 1.92 CM2
BH CV ECHO MEAS - MVA(VTI): 3.3 CM2
BH CV ECHO MEAS - PA ACC TIME: 0.06 SEC
BH CV ECHO MEAS - PA PR(ACCEL): 50.5 MMHG
BH CV ECHO MEAS - PA V2 MAX: 145.2 CM/SEC
BH CV ECHO MEAS - PULM A REVS DUR: 0.13 SEC
BH CV ECHO MEAS - PULM A REVS VEL: 60.8 CM/SEC
BH CV ECHO MEAS - PULM DIAS VEL: 71.5 CM/SEC
BH CV ECHO MEAS - PULM S/D: 1.31
BH CV ECHO MEAS - PULM SYS VEL: 93.5 CM/SEC
BH CV ECHO MEAS - RAP SYSTOLE: 3 MMHG
BH CV ECHO MEAS - RV MAX PG: 4.8 MMHG
BH CV ECHO MEAS - RV V1 MAX: 109.6 CM/SEC
BH CV ECHO MEAS - RV V1 VTI: 21.7 CM
BH CV ECHO MEAS - RVSP: 24 MMHG
BH CV ECHO MEAS - SI(MOD-SP2): 30.8 ML/M2
BH CV ECHO MEAS - SI(MOD-SP4): 32.5 ML/M2
BH CV ECHO MEAS - SV(LVOT): 113.4 ML
BH CV ECHO MEAS - SV(MOD-SP2): 72 ML
BH CV ECHO MEAS - SV(MOD-SP4): 76 ML
BH CV ECHO MEAS - TAPSE (>1.6): 3.5 CM
BH CV ECHO MEAS - TR MAX PG: 21.3 MMHG
BH CV ECHO MEAS - TR MAX VEL: 230.8 CM/SEC
BH CV ECHO MEASUREMENTS AVERAGE E/E' RATIO: 10.03
BH CV XLRA - TDI S': 17.3 CM/SEC
LEFT ATRIUM VOLUME INDEX: 23.4 ML/M2
MAXIMAL PREDICTED HEART RATE: 150 BPM
SINUS: 3.3 CM
STJ: 3.2 CM
STRESS TARGET HR: 128 BPM

## 2022-07-21 PROCEDURE — 93306 TTE W/DOPPLER COMPLETE: CPT

## 2022-07-21 PROCEDURE — 93306 TTE W/DOPPLER COMPLETE: CPT | Performed by: INTERNAL MEDICINE

## 2022-07-29 ENCOUNTER — TELEPHONE (OUTPATIENT)
Dept: CARDIOLOGY | Facility: CLINIC | Age: 72
End: 2022-07-29

## 2022-07-29 DIAGNOSIS — R00.1 BRADYCARDIA, SINUS: ICD-10-CM

## 2022-07-29 DIAGNOSIS — R94.31 ABNORMAL ELECTROCARDIOGRAM (ECG) (EKG): Primary | ICD-10-CM

## 2022-07-29 DIAGNOSIS — I10 PRIMARY HYPERTENSION: ICD-10-CM

## 2022-07-29 DIAGNOSIS — R01.1 HEART MURMUR: ICD-10-CM

## 2022-07-29 NOTE — TELEPHONE ENCOUNTER
----- Message from Suzanne Ricketts sent at 7/28/2022 10:12 AM EDT -----  Regarding: ORDER FOR TMT [facility] DOESN'T WANT TO DO PEER TO PEER FOR CARDIOLITE

## 2022-08-04 ENCOUNTER — APPOINTMENT (OUTPATIENT)
Dept: CARDIOLOGY | Facility: HOSPITAL | Age: 72
End: 2022-08-04

## 2022-08-22 ENCOUNTER — OFFICE VISIT (OUTPATIENT)
Dept: CARDIOLOGY | Facility: CLINIC | Age: 72
End: 2022-08-22

## 2022-08-22 ENCOUNTER — HOSPITAL ENCOUNTER (OUTPATIENT)
Dept: CARDIOLOGY | Facility: HOSPITAL | Age: 72
Discharge: HOME | End: 2022-08-22
Admitting: INTERNAL MEDICINE

## 2022-08-22 VITALS
DIASTOLIC BLOOD PRESSURE: 66 MMHG | BODY MASS INDEX: 31.56 KG/M2 | WEIGHT: 233 LBS | HEART RATE: 90 BPM | HEIGHT: 72 IN | SYSTOLIC BLOOD PRESSURE: 101 MMHG

## 2022-08-22 VITALS — DIASTOLIC BLOOD PRESSURE: 66 MMHG | HEART RATE: 76 BPM | SYSTOLIC BLOOD PRESSURE: 110 MMHG

## 2022-08-22 DIAGNOSIS — R00.1 BRADYCARDIA, SINUS: ICD-10-CM

## 2022-08-22 DIAGNOSIS — R01.1 HEART MURMUR: ICD-10-CM

## 2022-08-22 DIAGNOSIS — R06.09 DOE (DYSPNEA ON EXERTION): ICD-10-CM

## 2022-08-22 DIAGNOSIS — R01.1 HEART MURMUR: Primary | ICD-10-CM

## 2022-08-22 DIAGNOSIS — R94.31 ABNORMAL ELECTROCARDIOGRAM (ECG) (EKG): ICD-10-CM

## 2022-08-22 DIAGNOSIS — I10 PRIMARY HYPERTENSION: ICD-10-CM

## 2022-08-22 PROCEDURE — 99214 OFFICE O/P EST MOD 30 MIN: CPT | Performed by: INTERNAL MEDICINE

## 2022-08-22 PROCEDURE — 93017 CV STRESS TEST TRACING ONLY: CPT

## 2022-08-22 PROCEDURE — 93018 CV STRESS TEST I&R ONLY: CPT | Performed by: INTERNAL MEDICINE

## 2022-08-22 RX ORDER — EZETIMIBE 10 MG/1
TABLET ORAL
COMMUNITY

## 2022-08-22 RX ORDER — TADALAFIL 5 MG/1
1 TABLET ORAL DAILY
COMMUNITY

## 2022-08-22 RX ORDER — TERAZOSIN 10 MG/1
1 CAPSULE ORAL
COMMUNITY
End: 2022-08-22 | Stop reason: SDUPTHER

## 2022-08-22 RX ORDER — TAMSULOSIN HYDROCHLORIDE 0.4 MG/1
CAPSULE ORAL
COMMUNITY
Start: 2022-07-22

## 2022-08-22 NOTE — PROGRESS NOTES
" TREADMILL RESULT   Subjective:        Ld Carmen is a 71 y.o. male who here for follow up    CC  Follow-up shortness of breath hypertension  HPI  71-year-old male with shortness of breath bradycardia hypertension here for the follow-up with no complaints of chest pains tightness heaviness or the pressure sensation     Problems Addressed this Visit        Cardiac and Vasculature    Heart murmur - Primary    RUIZ (dyspnea on exertion)    Bradycardia, sinus    Relevant Medications    tadalafil (CIALIS) 5 MG tablet    Primary hypertension      Diagnoses       Codes Comments    Heart murmur    -  Primary ICD-10-CM: R01.1  ICD-9-CM: 785.2     Bradycardia, sinus     ICD-10-CM: R00.1  ICD-9-CM: 427.89     RUIZ (dyspnea on exertion)     ICD-10-CM: R06.00  ICD-9-CM: 786.09     Primary hypertension     ICD-10-CM: I10  ICD-9-CM: 401.9         .    The following portions of the patient's history were reviewed and updated as appropriate: allergies, current medications, past family history, past medical history, past social history, past surgical history and problem list.    Past Medical History:   Diagnosis Date   • Arthritis    • Hypertension      reports that he has never smoked. He has never used smokeless tobacco. He reports current alcohol use. He reports that he does not use drugs.   Family History   Problem Relation Age of Onset   • Hypertension Mother    • Heart disease Mother    • Heart disease Father        Review of Systems  Constitutional: No wt loss, fever, fatigue  Gastrointestinal: No nausea, abdominal pain  Behavioral/Psych: No insomnia or anxiety   Cardiovascular no chest pains or tightness in the chest  Objective:       Physical Exam  /66   Pulse 90   Ht 182.9 cm (72\")   Wt 106 kg (233 lb)   BMI 31.60 kg/m²   General appearance: No acute changes   Neck: Trachea midline; NECK, supple, no thyromegaly or lymphadenopathy   Lungs: Normal size and shape, normal breath sounds, equal distribution of air, no " rales and rhonchi   CV: S1-S2 regular, no murmurs, no rub, no gallop   Abdomen: Soft, nontender; no masses , no abnormal abdominal sounds   Extremities: No deformity , normal color , no peripheral edema   Skin: Normal temperature, turgor and texture; no rash, ulcers          Procedures      Echocardiogram:        Current Outpatient Medications:   •  amlodipine-olmesartan (Yazan) 5-20 MG per tablet, Take 1 tablet by mouth Daily., Disp: 30 tablet, Rfl: 6  •  ascorbic acid (VITAMIN C) 1000 MG tablet, Take 1 tablet by mouth Daily., Disp: , Rfl:   •  aspirin 81 MG EC tablet, Take 81 mg by mouth Daily., Disp: , Rfl:   •  CoenzymeQ10-Isoleucine-Glycine (CO Q-10) 100-50-25 MG tablet sustained-release 24 hour, 1 po daily, Disp: , Rfl:   •  ezetimibe (ZETIA) 10 MG tablet, take 1 tablet by oral route 2 times a week, Disp: , Rfl:   •  folic acid (FOLVITE) 1 MG tablet, , Disp: , Rfl:   •  Magnesium 200 MG chewable tablet, 2 tab po daily, Disp: , Rfl:   •  METHOTREXATE SODIUM PO, Take  by mouth., Disp: , Rfl:   •  spironolactone-hydrochlorothiazide (ALDACTAZIDE) 25-25 MG tablet, , Disp: , Rfl:   •  tadalafil (CIALIS) 5 MG tablet, Take 1 tablet by mouth Daily., Disp: , Rfl:   •  tamsulosin (FLOMAX) 0.4 MG capsule 24 hr capsule, , Disp: , Rfl:   •  vitamin B-6 (PYRIDOXINE) 100 MG tablet, 1 po daily, Disp: , Rfl:   •  Zinc 50 MG capsule, Take 1 tablet by mouth Daily., Disp: , Rfl:   •  Armodafinil 250 MG tablet, , Disp: , Rfl:   •  sertraline (ZOLOFT) 100 MG tablet, , Disp: , Rfl:   •  triamterene-hydrochlorothiazide (MAXZIDE-25) 37.5-25 MG per tablet, Take 1 tablet by mouth., Disp: , Rfl:    Assessment:        Patient Active Problem List   Diagnosis   • Heart murmur   • RUIZ (dyspnea on exertion)   • Bradycardia, sinus   • Primary hypertension               Plan:            ICD-10-CM ICD-9-CM   1. Heart murmur  R01.1 785.2   2. Bradycardia, sinus  R00.1 427.89   3. RUIZ (dyspnea on exertion)  R06.00 786.09   4. Primary hypertension   I10 401.9     1. Heart murmur  No change    2. Bradycardia, sinus  Stable    3. RUIZ (dyspnea on exertion)  Multifactorial    4. Primary hypertension  Blood pressure controlled       Specificity and sensitivity of the stress test/ cardiac workup has been explained. Pt has been explained if  Symptoms continue please go to ER, and further w/p will be required.    Also explained this does not rule out coronary artery disease or the future events, continue to emphasize on risk reductions for coronary artery disease    Pt also advised to contact PCP for other causes of symptoms    1 YR    COUNSELING:    Ld Rodrigues was given to patient for the following topics: diagnostic results, risk factor reductions, impressions, risks and benefits of treatment options and importance of treatment compliance .       SMOKING COUNSELING:    [unfilled]    Dictated using Dragon dictation

## 2022-08-25 LAB
BH CV STRESS BP STAGE 1: NORMAL
BH CV STRESS BP STAGE 2: NORMAL
BH CV STRESS DURATION MIN STAGE 1: 3
BH CV STRESS DURATION MIN STAGE 2: 3
BH CV STRESS DURATION SEC STAGE 1: 0
BH CV STRESS DURATION SEC STAGE 2: 0
BH CV STRESS GRADE STAGE 1: 10
BH CV STRESS GRADE STAGE 2: 12
BH CV STRESS HR STAGE 1: 124
BH CV STRESS HR STAGE 2: 140
BH CV STRESS METS STAGE 1: 4.6
BH CV STRESS METS STAGE 2: 7.1
BH CV STRESS PROTOCOL 1: NORMAL
BH CV STRESS RECOVERY BP: NORMAL MMHG
BH CV STRESS RECOVERY HR: 91 BPM
BH CV STRESS SPEED STAGE 1: 1.7
BH CV STRESS SPEED STAGE 2: 2.5
BH CV STRESS STAGE 1: 1
BH CV STRESS STAGE 2: 2
MAXIMAL PREDICTED HEART RATE: 150 BPM
PERCENT MAX PREDICTED HR: 94 %
STRESS BASELINE BP: NORMAL MMHG
STRESS BASELINE HR: 73 BPM
STRESS PERCENT HR: 111 %
STRESS POST ESTIMATED WORKLOAD: 7.1 METS
STRESS POST EXERCISE DUR MIN: 6 MIN
STRESS POST EXERCISE DUR SEC: 0 SEC
STRESS POST PEAK BP: NORMAL MMHG
STRESS POST PEAK HR: 141 BPM
STRESS TARGET HR: 128 BPM

## 2023-02-06 RX ORDER — AMLODIPINE AND OLMESARTAN MEDOXOMIL 5; 20 MG/1; MG/1
TABLET ORAL
Qty: 30 TABLET | Refills: 3 | Status: SHIPPED | OUTPATIENT
Start: 2023-02-06

## 2023-08-17 ENCOUNTER — OFFICE VISIT (OUTPATIENT)
Dept: CARDIOLOGY | Facility: CLINIC | Age: 73
End: 2023-08-17
Payer: MEDICARE

## 2023-08-17 VITALS
DIASTOLIC BLOOD PRESSURE: 81 MMHG | OXYGEN SATURATION: 95 % | HEIGHT: 72 IN | BODY MASS INDEX: 34.67 KG/M2 | WEIGHT: 256 LBS | SYSTOLIC BLOOD PRESSURE: 145 MMHG | HEART RATE: 53 BPM

## 2023-08-17 DIAGNOSIS — R01.1 HEART MURMUR: ICD-10-CM

## 2023-08-17 DIAGNOSIS — R06.09 DOE (DYSPNEA ON EXERTION): Primary | ICD-10-CM

## 2023-08-17 DIAGNOSIS — R94.31 ABNORMAL ELECTROCARDIOGRAM (ECG) (EKG): ICD-10-CM

## 2023-08-17 DIAGNOSIS — I10 PRIMARY HYPERTENSION: ICD-10-CM

## 2023-08-17 PROCEDURE — 93000 ELECTROCARDIOGRAM COMPLETE: CPT | Performed by: INTERNAL MEDICINE

## 2023-08-17 PROCEDURE — 99214 OFFICE O/P EST MOD 30 MIN: CPT | Performed by: INTERNAL MEDICINE

## 2023-08-17 PROCEDURE — 3077F SYST BP >= 140 MM HG: CPT | Performed by: INTERNAL MEDICINE

## 2023-08-17 PROCEDURE — 3079F DIAST BP 80-89 MM HG: CPT | Performed by: INTERNAL MEDICINE

## 2023-08-17 RX ORDER — MELOXICAM 15 MG/1
TABLET ORAL
COMMUNITY
Start: 2023-08-14

## 2023-08-17 RX ORDER — BLACK COHOSH ROOT EXTRACT 80 MG
CAPSULE ORAL
COMMUNITY
Start: 2022-12-01

## 2023-08-17 RX ORDER — OLMESARTAN MEDOXOMIL AND HYDROCHLOROTHIAZIDE 40/25 40; 25 MG/1; MG/1
1 TABLET ORAL DAILY
COMMUNITY
Start: 2023-08-03

## 2023-08-17 NOTE — PROGRESS NOTES
Subjective:        Ld Carmen is a 72 y.o. male who here for follow up    CC  Follow-up heart murmur shortness of breath hypertension  HPI  72-year-old male with a heart murmur shortness of breath primary hypertension here for the follow-up with no complaints of chest pain tightness heaviness or the pressure sensation     Problems Addressed this Visit          Cardiac and Vasculature    Heart murmur    RUIZ (dyspnea on exertion) - Primary    Primary hypertension    Relevant Medications    olmesartan-hydrochlorothiazide (BENICAR HCT) 40-25 MG per tablet     Other Visit Diagnoses       Abnormal electrocardiogram (ECG) (EKG)              Diagnoses         Codes Comments    RUIZ (dyspnea on exertion)    -  Primary ICD-10-CM: R06.09  ICD-9-CM: 786.09     Primary hypertension     ICD-10-CM: I10  ICD-9-CM: 401.9     Abnormal electrocardiogram (ECG) (EKG)     ICD-10-CM: R94.31  ICD-9-CM: 794.31     Heart murmur     ICD-10-CM: R01.1  ICD-9-CM: 785.2           .    The following portions of the patient's history were reviewed and updated as appropriate: allergies, current medications, past family history, past medical history, past social history, past surgical history and problem list.    Past Medical History:   Diagnosis Date    Arthritis     Hypertension      reports that he has never smoked. He has never used smokeless tobacco. He reports that he does not currently use alcohol. He reports that he does not use drugs.   Family History   Problem Relation Age of Onset    Hypertension Mother     Heart disease Mother     Heart disease Father        Review of Systems  Constitutional: No wt loss, fever, fatigue  Gastrointestinal: No nausea, abdominal pain  Behavioral/Psych: No insomnia or anxiety   Cardiovascular no chest pains or tightness in the chestElectronically signed by Hardeep Barger MD, 08/29/23, 11:18 AM EDT.    Objective:       Physical Exam  /81 (BP Location: Right arm, Patient Position: Sitting, Cuff  "Size: Adult)   Pulse 53   Ht 182.9 cm (72.01\")   Wt 116 kg (256 lb)   SpO2 95%   BMI 34.71 kg/mý   General appearance: No acute changes   Neck: Trachea midline; NECK, supple, no thyromegaly or lymphadenopathy   Lungs: Normal size and shape, normal breath sounds, equal distribution of air, no rales and rhonchi   CV: S1-S2 regular, no murmurs, no rub, no gallop   Abdomen: Soft, nontender; no masses , no abnormal abdominal sounds   Extremities: No deformity , normal color , no peripheral edema   Skin: Normal temperature, turgor and texture; no rash, ulcers            ECG 12 Lead    Date/Time: 8/17/2023 10:32 AM  Performed by: Hardeep Barger MD  Authorized by: Hardeep Barger MD   Comparison: compared with previous ECG   Similar to previous ECG  Rhythm: sinus rhythm  ST Flattening: all    Clinical impression: non-specific ECG          Echocardiogram:        Current Outpatient Medications:     ascorbic acid (VITAMIN C) 1000 MG tablet, Take 1 tablet by mouth Daily., Disp: , Rfl:     aspirin 81 MG EC tablet, Take 1 tablet by mouth Daily., Disp: , Rfl:     Bioflavonoid Products (Quercetin Complex Immune) capsule, , Disp: , Rfl:     CoenzymeQ10-Isoleucine-Glycine (CO Q-10) 100-50-25 MG tablet sustained-release 24 hour, 1 po daily, Disp: , Rfl:     ezetimibe (ZETIA) 10 MG tablet, take 1 tablet by oral route 2 times a week, Disp: , Rfl:     folic acid (FOLVITE) 1 MG tablet, , Disp: , Rfl:     Magnesium 200 MG chewable tablet, 2 tab po daily, Disp: , Rfl:     meloxicam (MOBIC) 15 MG tablet, TAKE 1 TABLET BY MOUTH EVERY DAY WITH FOOD AS DIRECTED, Disp: , Rfl:     METHOTREXATE SODIUM PO, Take  by mouth., Disp: , Rfl:     olmesartan-hydrochlorothiazide (BENICAR HCT) 40-25 MG per tablet, Take 1 tablet by mouth Daily., Disp: , Rfl:     tadalafil (CIALIS) 5 MG tablet, Take 1 tablet by mouth Daily., Disp: , Rfl:     tamsulosin (FLOMAX) 0.4 MG capsule 24 hr capsule, , Disp: , Rfl:     Tocilizumab (ACTPEN) 162 " MG/0.9ML solution auto-injector injection, , Disp: , Rfl:     vitamin B-6 (PYRIDOXINE) 100 MG tablet, 1 po daily, Disp: , Rfl:     Zinc 50 MG capsule, Take 1 tablet by mouth Daily., Disp: , Rfl:     Armodafinil 250 MG tablet, , Disp: , Rfl:     sertraline (ZOLOFT) 100 MG tablet, , Disp: , Rfl:    Assessment:        Patient Active Problem List   Diagnosis    Heart murmur    RUIZ (dyspnea on exertion)    Bradycardia, sinus    Primary hypertension               Plan:            ICD-10-CM ICD-9-CM   1. RUIZ (dyspnea on exertion)  R06.09 786.09   2. Primary hypertension  I10 401.9   3. Abnormal electrocardiogram (ECG) (EKG)  R94.31 794.31   4. Heart murmur  R01.1 785.2     1. RUIZ (dyspnea on exertion)  Multifactorial    2. Primary hypertension  Blood pressure controlled    3. Abnormal electrocardiogram (ECG) (EKG)  No angina pectoris    4. Heart murmur  No change      1 yr  COUNSELING:    Ld Rodrigues was given to patient for the following topics: diagnostic results, risk factor reductions, impressions, risks and benefits of treatment options and importance of treatment compliance .       SMOKING COUNSELING:        Dictated using Dragon dictation